# Patient Record
Sex: FEMALE | Race: BLACK OR AFRICAN AMERICAN | NOT HISPANIC OR LATINO | Employment: FULL TIME | URBAN - METROPOLITAN AREA
[De-identification: names, ages, dates, MRNs, and addresses within clinical notes are randomized per-mention and may not be internally consistent; named-entity substitution may affect disease eponyms.]

---

## 2023-10-30 PROBLEM — E04.9 ENLARGED THYROID: Status: ACTIVE | Noted: 2023-10-30

## 2023-10-30 PROBLEM — R53.83 FATIGUE: Status: ACTIVE | Noted: 2023-10-30

## 2023-10-30 PROBLEM — E66.812 CLASS II OBESITY: Status: ACTIVE | Noted: 2023-10-30

## 2023-10-30 PROBLEM — N76.0 BACTERIAL VAGINOSIS: Status: ACTIVE | Noted: 2023-10-30

## 2023-10-30 PROBLEM — B96.89 BACTERIAL VAGINOSIS: Status: ACTIVE | Noted: 2023-10-30

## 2023-10-30 PROBLEM — N89.8 VAGINAL DISCHARGE: Status: ACTIVE | Noted: 2023-10-30

## 2023-10-30 PROBLEM — N89.8 VAGINAL IRRITATION: Status: ACTIVE | Noted: 2023-10-30

## 2023-10-30 PROBLEM — R82.90 ABNORMAL URINALYSIS: Status: ACTIVE | Noted: 2023-10-30

## 2023-10-30 PROBLEM — Z72.51 HIGH RISK SEXUAL BEHAVIOR: Status: ACTIVE | Noted: 2023-10-30

## 2023-10-30 PROBLEM — B37.31 CANDIDIASIS OF VAGINA: Status: ACTIVE | Noted: 2023-10-30

## 2023-10-30 PROBLEM — R10.10 PAIN OF UPPER ABDOMEN: Status: ACTIVE | Noted: 2023-10-30

## 2023-10-30 PROBLEM — R03.0 ELEVATED BLOOD PRESSURE READING: Status: ACTIVE | Noted: 2023-10-30

## 2023-10-30 PROBLEM — E66.9 CLASS II OBESITY: Status: ACTIVE | Noted: 2023-10-30

## 2023-10-30 RX ORDER — NORETHINDRONE 0.35 MG/1
1 TABLET ORAL DAILY
COMMUNITY
Start: 2022-05-13

## 2023-10-30 RX ORDER — ULIPRISTAL ACETATE 30 MG/1
30 TABLET ORAL ONCE
COMMUNITY
Start: 2022-05-13

## 2023-10-30 RX ORDER — METRONIDAZOLE 500 MG/1
1 TABLET ORAL 2 TIMES DAILY
COMMUNITY
Start: 2022-05-16 | End: 2024-04-23 | Stop reason: ALTCHOICE

## 2024-04-07 ENCOUNTER — HOSPITAL ENCOUNTER (EMERGENCY)
Facility: HOSPITAL | Age: 39
Discharge: HOME | End: 2024-04-07
Payer: COMMERCIAL

## 2024-04-07 ENCOUNTER — APPOINTMENT (OUTPATIENT)
Dept: RADIOLOGY | Facility: HOSPITAL | Age: 39
End: 2024-04-07
Payer: COMMERCIAL

## 2024-04-07 VITALS
BODY MASS INDEX: 32.75 KG/M2 | WEIGHT: 156 LBS | HEART RATE: 75 BPM | OXYGEN SATURATION: 100 % | RESPIRATION RATE: 20 BRPM | SYSTOLIC BLOOD PRESSURE: 133 MMHG | TEMPERATURE: 98.2 F | DIASTOLIC BLOOD PRESSURE: 80 MMHG | HEIGHT: 58 IN

## 2024-04-07 DIAGNOSIS — S61.421A LACERATION OF RIGHT HAND WITH FOREIGN BODY, INITIAL ENCOUNTER: Primary | ICD-10-CM

## 2024-04-07 PROCEDURE — 2500000005 HC RX 250 GENERAL PHARMACY W/O HCPCS: Performed by: NURSE PRACTITIONER

## 2024-04-07 PROCEDURE — 90715 TDAP VACCINE 7 YRS/> IM: CPT | Performed by: NURSE PRACTITIONER

## 2024-04-07 PROCEDURE — 73130 X-RAY EXAM OF HAND: CPT | Mod: RIGHT SIDE | Performed by: RADIOLOGY

## 2024-04-07 PROCEDURE — 2500000004 HC RX 250 GENERAL PHARMACY W/ HCPCS (ALT 636 FOR OP/ED): Performed by: NURSE PRACTITIONER

## 2024-04-07 PROCEDURE — 12002 RPR S/N/AX/GEN/TRNK2.6-7.5CM: CPT | Performed by: NURSE PRACTITIONER

## 2024-04-07 PROCEDURE — 99284 EMERGENCY DEPT VISIT MOD MDM: CPT | Mod: 25

## 2024-04-07 PROCEDURE — 90471 IMMUNIZATION ADMIN: CPT | Performed by: NURSE PRACTITIONER

## 2024-04-07 PROCEDURE — 73110 X-RAY EXAM OF WRIST: CPT | Mod: RIGHT SIDE | Performed by: RADIOLOGY

## 2024-04-07 PROCEDURE — 2500000001 HC RX 250 WO HCPCS SELF ADMINISTERED DRUGS (ALT 637 FOR MEDICARE OP): Performed by: NURSE PRACTITIONER

## 2024-04-07 PROCEDURE — 73110 X-RAY EXAM OF WRIST: CPT | Mod: RT

## 2024-04-07 PROCEDURE — 73130 X-RAY EXAM OF HAND: CPT | Mod: RT

## 2024-04-07 RX ORDER — CEPHALEXIN 500 MG/1
500 CAPSULE ORAL 3 TIMES DAILY
Qty: 15 CAPSULE | Refills: 0 | Status: SHIPPED | OUTPATIENT
Start: 2024-04-07 | End: 2024-04-07 | Stop reason: SDUPTHER

## 2024-04-07 RX ORDER — CEPHALEXIN 500 MG/1
500 CAPSULE ORAL 3 TIMES DAILY
Qty: 15 CAPSULE | Refills: 0 | Status: SHIPPED | OUTPATIENT
Start: 2024-04-07 | End: 2024-04-12

## 2024-04-07 RX ORDER — NAPROXEN 500 MG/1
500 TABLET ORAL
Qty: 14 TABLET | Refills: 0 | Status: SHIPPED | OUTPATIENT
Start: 2024-04-07 | End: 2024-04-14

## 2024-04-07 RX ORDER — ACETAMINOPHEN 500 MG
1000 TABLET ORAL
Qty: 28 TABLET | Refills: 0 | Status: SHIPPED | OUTPATIENT
Start: 2024-04-07 | End: 2024-04-07 | Stop reason: SDUPTHER

## 2024-04-07 RX ORDER — LIDOCAINE HYDROCHLORIDE 10 MG/ML
5 INJECTION INFILTRATION; PERINEURAL ONCE
Status: COMPLETED | OUTPATIENT
Start: 2024-04-07 | End: 2024-04-07

## 2024-04-07 RX ORDER — CEPHALEXIN 500 MG/1
500 CAPSULE ORAL ONCE
Status: COMPLETED | OUTPATIENT
Start: 2024-04-07 | End: 2024-04-07

## 2024-04-07 RX ORDER — NAPROXEN 500 MG/1
500 TABLET ORAL
Qty: 14 TABLET | Refills: 0 | Status: SHIPPED | OUTPATIENT
Start: 2024-04-07 | End: 2024-04-07 | Stop reason: SDUPTHER

## 2024-04-07 RX ORDER — ACETAMINOPHEN 500 MG
1000 TABLET ORAL
Qty: 28 TABLET | Refills: 0 | Status: SHIPPED | OUTPATIENT
Start: 2024-04-07 | End: 2024-04-14

## 2024-04-07 RX ADMIN — CEPHALEXIN 500 MG: 500 CAPSULE ORAL at 14:39

## 2024-04-07 RX ADMIN — TETANUS TOXOID, REDUCED DIPHTHERIA TOXOID AND ACELLULAR PERTUSSIS VACCINE, ADSORBED 0.5 ML: 5; 2.5; 8; 8; 2.5 SUSPENSION INTRAMUSCULAR at 14:39

## 2024-04-07 RX ADMIN — LIDOCAINE HYDROCHLORIDE 50 MG: 10 INJECTION, SOLUTION INFILTRATION; PERINEURAL at 15:35

## 2024-04-07 ASSESSMENT — COLUMBIA-SUICIDE SEVERITY RATING SCALE - C-SSRS
1. IN THE PAST MONTH, HAVE YOU WISHED YOU WERE DEAD OR WISHED YOU COULD GO TO SLEEP AND NOT WAKE UP?: NO
2. HAVE YOU ACTUALLY HAD ANY THOUGHTS OF KILLING YOURSELF?: NO
6. HAVE YOU EVER DONE ANYTHING, STARTED TO DO ANYTHING, OR PREPARED TO DO ANYTHING TO END YOUR LIFE?: NO

## 2024-04-07 NOTE — Clinical Note
Ana Castano was seen and treated in our emergency department on 4/7/2024.  She may return to work on 04/15/2024.       If you have any questions or concerns, please don't hesitate to call.      Lukasz Borrero, ZE-CNP

## 2024-04-07 NOTE — ED PROVIDER NOTES
HPI   Chief Complaint   Patient presents with    Laceration       38-year-old female presents today with a laceration to her right hand and possible laceration to her medial scalp.  When she was opening a door at work the glass broke on the door and fell lacerating the hand and wrist.  She denies paresthesia or limited range of motion of the hand.  She does not remember her last tetanus.  She has no allergies to antibiotics.  She has full range of motion of upper and lower extremities and denies any other lacerations at this time.  She denies loss of consciousness.  She denies nausea or vomiting.  She denies vision change.  She denies posterior neck, thoracic, or lumbar pain.  She is not on any anticoagulant medication.  She denies pregnancy.      History provided by:  Patient   used: No                        No data recorded                   Patient History   Past Medical History:   Diagnosis Date    Thrombocytopenia, unspecified (CMS/[facility]) 2013    Thrombocytopenia     Past Surgical History:   Procedure Laterality Date    OTHER SURGICAL HISTORY  2016    Surgical Treatment For      Family History   Problem Relation Name Age of Onset    Diabetes Mother      Hypertension Mother      Colon cancer Father       Social History     Tobacco Use    Smoking status: Not on file    Smokeless tobacco: Not on file   Substance Use Topics    Alcohol use: Not on file    Drug use: Not on file       Physical Exam   ED Triage Vitals [24 1422]   Temperature Heart Rate Respirations BP   36.8 °C (98.2 °F) 75 20 133/80      Pulse Ox Temp Source Heart Rate Source Patient Position   100 % Oral -- --      BP Location FiO2 (%)     -- --       Physical Exam  Constitutional:       Appearance: Normal appearance.   HENT:      Head: Normocephalic and atraumatic.      Right Ear: Tympanic membrane normal.      Left Ear: Tympanic membrane normal.      Nose: Nose normal.      Mouth/Throat:      Mouth:  Mucous membranes are dry.   Eyes:      Extraocular Movements: Extraocular movements intact.      Pupils: Pupils are equal, round, and reactive to light.   Cardiovascular:      Rate and Rhythm: Normal rate and regular rhythm.      Pulses: Normal pulses.      Heart sounds: Normal heart sounds.   Pulmonary:      Effort: Pulmonary effort is normal.      Breath sounds: Normal breath sounds.   Abdominal:      General: Abdomen is flat.      Palpations: Abdomen is soft.   Musculoskeletal:         General: Normal range of motion.      Cervical back: Normal range of motion and neck supple.   Skin:     General: Skin is warm.      Capillary Refill: Capillary refill takes less than 2 seconds.      Comments: 2 lacerations to the posterior aspect of her right hand with full range of motion.  The first laceration is approximately 2 cm x 1 cm x 1 cm and the second laceration is approximately 2 cm x 0.5 cm x 0.5 cm.  She has a very slight laceration on her scalp which is 0.25 x 0.25 x 0.25 cm.   Neurological:      General: No focal deficit present.      Mental Status: She is alert and oriented to person, place, and time.   Psychiatric:         Mood and Affect: Mood normal.         Behavior: Behavior normal.         ED Course & MDM   Diagnoses as of 04/07/24 1730   Laceration of right hand with foreign body, initial encounter       Medical Decision Making  2 lacerations to the posterior aspect of her right hand with full range of motion.  The first laceration is approximately 2 cm x 1 cm x 1 cm and the second laceration is approximately 2 cm x 0.5 cm x 0.5 cm.  She has a very slight laceration on her scalp which is 0.25 x 0.25 x 0.25 cm..  She had full range of motion.  She received tetanus booster.  She received Keflex.  X-ray was ordered of wrist and hand prior to repair to rule out foreign body as it was glass and should be easily seen on x-ray.  X-ray confirmed no fracture or subluxation.  There was no radiopaque foreign body.   Wound was thoroughly irrigated with Hibiclens and normal saline.  It was repaired with a total of 10 sutures.  There were 5 sutures to the 1 laceration of the dorsal hand for lacerations to the second laceration of the dorsal hand and one laceration to the third laceration for a total of 10.  These were 4 point 0 nylon sutures should be removed in 7 to 10 days.  Patient received tetanus booster.  She was placed on Keflex for 5 days.  I requested an appointment with hand surgery Dr. Paramjit Mcduffie for outpatient follow-up as needed and patient received her requested work note.    Amount and/or Complexity of Data Reviewed  Radiology: ordered and independent interpretation performed.        Procedure  Laceration Repair    Performed by: MECHELLE Back  Authorized by: MECHELLE Back    Consent:     Consent obtained:  Verbal    Consent given by:  Patient    Risks discussed:  Infection, pain and retained foreign body    Alternatives discussed:  No treatment, delayed treatment, observation and referral  Universal protocol:     Procedure explained and questions answered to patient or proxy's satisfaction: yes      Imaging studies available: yes      Patient identity confirmed:  Verbally with patient  Anesthesia:     Anesthesia method:  Local infiltration    Local anesthetic:  Lidocaine 1% w/o epi  Laceration details:     Location:  Hand    Hand location:  R hand, dorsum    Length (cm):  4    Depth (mm):  5  Pre-procedure details:     Preparation:  Patient was prepped and draped in usual sterile fashion  Exploration:     Limited defect created (wound extended): no      Hemostasis achieved with:  Direct pressure    Imaging obtained: x-ray      Imaging outcome: foreign body not noted      Wound exploration: wound explored through full range of motion and entire depth of wound visualized      Wound extent: no areolar tissue violation noted, no fascia violation noted, no foreign bodies/material noted, no  muscle damage noted, no nerve damage noted, no tendon damage noted, no underlying fracture noted and no vascular damage noted      Contaminated: no    Treatment:     Area cleansed with:  Chlorhexidine and saline    Amount of cleaning:  Extensive    Irrigation solution:  Sterile saline    Irrigation volume:  1l    Irrigation method:  Pressure wash    Visualized foreign bodies/material removed: no      Debridement:  None    Undermining:  None    Scar revision: no    Skin repair:     Repair method:  Sutures    Suture size:  4-0    Suture material:  Nylon    Suture technique:  Simple interrupted    Number of sutures:  10  Approximation:     Approximation:  Close  Repair type:     Repair type:  Simple  Post-procedure details:     Dressing:  Non-adherent dressing    Procedure completion:  Tolerated       MECHELLE Back  04/07/24 1623       ZE Back-ODALIS  04/07/24 1735

## 2024-04-07 NOTE — ED TRIAGE NOTES
Pt. Presents to the ED from work. 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 Two lacerations noted on right hand. Scant amount of blood noted on top of fore head. No LOC, no blood thinner. Patient alert and oriented x4.

## 2024-04-23 ENCOUNTER — OFFICE VISIT (OUTPATIENT)
Dept: ORTHOPEDIC SURGERY | Facility: CLINIC | Age: 39
End: 2024-04-23
Payer: COMMERCIAL

## 2024-04-23 DIAGNOSIS — S61.411A COMPLICATED LACERATION OF HAND, RIGHT, INITIAL ENCOUNTER: Primary | ICD-10-CM

## 2024-04-23 PROCEDURE — 99203 OFFICE O/P NEW LOW 30 MIN: CPT | Performed by: ORTHOPAEDIC SURGERY

## 2024-04-23 PROCEDURE — 1036F TOBACCO NON-USER: CPT | Performed by: ORTHOPAEDIC SURGERY

## 2024-04-23 RX ORDER — NORGESTIMATE AND ETHINYL ESTRADIOL 0.25-0.035
KIT ORAL
COMMUNITY
Start: 2018-03-12 | End: 2024-04-23 | Stop reason: ALTCHOICE

## 2024-04-23 ASSESSMENT — PAIN - FUNCTIONAL ASSESSMENT: PAIN_FUNCTIONAL_ASSESSMENT: NO/DENIES PAIN

## 2024-04-23 NOTE — LETTER
April 23, 2024     Patient: Ana Castano   YOB: 1985   Date of Visit: 4/23/2024       To Whom It May Concern:    It is my medical opinion that Aan Castano may return to work on 4/29/24, with no restrictions .    If you have any questions or concerns, please don't hesitate to call.         Sincerely,        Paramjit Mcduffie MD

## 2024-04-23 NOTE — PROGRESS NOTES
History of Present Illness:  Chief Complaint   Patient presents with    Right Hand - Laceration       Patient presents today for evaluation of right hand laceration about the dorsal aspect of her right hand in line with the index finger.  She was opening a door at work when the glass on the door broke and caused a laceration to her right hand.  She had immediate pain as well as some bleeding.  No numbness or tingling.  She was seen in the emergency room where wound is irrigated and reapproximated with nonabsorbable suture.  She was started on oral prophylactic antibiotics and tetanus updated.  She did also sustain injury to her head from the door.  She has further follow-up scheduled with another provider for that.    Past Medical History:   Diagnosis Date    Thrombocytopenia, unspecified (CMS-[facility]) 12/14/2013    Thrombocytopenia       Medication Documentation Review Audit       Reviewed by Francesca Stroud CMA (Medical Assistant) on 04/23/24 at 0940      Medication Order Taking? Sig Documenting Provider Last Dose Status     Discontinued 04/23/24 0940   norethindrone (Josee) 0.35 mg tablet [id number]  Take 1 tablet (0.35 mg) by mouth once daily. TAKE AT THE SAME TIME EVERY DAY, USE A BACKUP METHOD OF CONTRACEPTION IF YOU MISS A PILL.  TO START 5 DAYS AFTER EMERGENCY BIRTHCONTROL Historical Provider, MD  Active    Discontinued 04/23/24 0940   ulipristal (Lory) 30 mg tablet [id number]  Take 1 tablet (30 mg) by mouth 1 time. TAKE UP  HOURS AFTER UNPROTECTED INTERCOURSE Historical Provider, MD  Active                    Allergies   Allergen Reactions    Peanut Swelling       Social History     Socioeconomic History    Marital status: Single     Spouse name: Not on file    Number of children: Not on file    Years of education: Not on file    Highest education level: Not on file   Occupational History    Not on file   Tobacco Use    Smoking status: Never    Smokeless tobacco: Never   Substance and Sexual Activity     Alcohol use: Not Currently    Drug use: Never    Sexual activity: Defer   Other Topics Concern    Not on file   Social History Narrative    Not on file     Social Determinants of Health     Financial Resource Strain: Not on file   Food Insecurity: Not on file   Transportation Needs: Not on file   Physical Activity: Not on file   Stress: Not on file   Social Connections: Not on file   Intimate Partner Violence: Not on file   Housing Stability: Not on file       Past Surgical History:   Procedure Laterality Date    OTHER SURGICAL HISTORY  2016    Surgical Treatment For         Review of Systems   GENERAL: Negative for malaise, significant weight loss, fever  MUSCULOSKELETAL: see HPI  NEURO:  Negative     Physical Examination  Constitutional: Appears well-developed and well-nourished.  Head: Normocephalic and atraumatic.  Eyes: EOMI grossly  Cardiovascular: Intact distal pulses.   Respiratory: Effort normal. No respiratory distress.  Neurologic: Alert and oriented to person, place, and time.  Skin: Skin is warm and dry.  Hematologic / Lymphatic: No lymphedema, lymphangitis.  Psychiatric: normal mood and affect. Behavior is normal.   Musculoskeletal:  Right hand: Laceration is well-healing and sutures removed.  No signs of infection.  0 cm DPC.  EIP/EDC to index finger grossly intact.  Sensation intact throughout distally.  Capillary refill less than 2 seconds.    Radiographs: Right hand radiographs available for review: No fracture/dislocation.  Joint spaces maintained.     Assessment:  Patient with well-healing right hand laceration without evidence of neurovascular or tendon involvement.     Plan:  Nature of the diagnosis was discussed with the patient.  Sutures were removed uneventfully.  Recommend that she begin scar tissue massage and gradual progression of activities as tolerated.  From standpoint of her hand she may return to work without restrictions.  Follow-up in 4 weeks for clinical check if  any issues or concerns.

## 2024-07-09 ENCOUNTER — APPOINTMENT (OUTPATIENT)
Dept: PRIMARY CARE | Facility: CLINIC | Age: 39
End: 2024-07-09
Payer: COMMERCIAL

## 2024-08-19 ENCOUNTER — HOSPITAL ENCOUNTER (EMERGENCY)
Facility: HOSPITAL | Age: 39
Discharge: HOME | End: 2024-08-19
Payer: COMMERCIAL

## 2024-08-19 VITALS
BODY MASS INDEX: 33.26 KG/M2 | HEART RATE: 71 BPM | SYSTOLIC BLOOD PRESSURE: 123 MMHG | WEIGHT: 165 LBS | TEMPERATURE: 98.6 F | RESPIRATION RATE: 19 BRPM | OXYGEN SATURATION: 99 % | DIASTOLIC BLOOD PRESSURE: 75 MMHG | HEIGHT: 59 IN

## 2024-08-19 DIAGNOSIS — J02.9 VIRAL PHARYNGITIS: Primary | ICD-10-CM

## 2024-08-19 LAB
HETEROPH AB SERPLBLD QL IA.RAPID: NEGATIVE
S PYO DNA THROAT QL NAA+PROBE: NOT DETECTED
SARS-COV-2 RNA RESP QL NAA+PROBE: NOT DETECTED

## 2024-08-19 PROCEDURE — 87651 STREP A DNA AMP PROBE: CPT

## 2024-08-19 PROCEDURE — 99283 EMERGENCY DEPT VISIT LOW MDM: CPT

## 2024-08-19 PROCEDURE — 36415 COLL VENOUS BLD VENIPUNCTURE: CPT

## 2024-08-19 PROCEDURE — 87635 SARS-COV-2 COVID-19 AMP PRB: CPT

## 2024-08-19 PROCEDURE — 2500000001 HC RX 250 WO HCPCS SELF ADMINISTERED DRUGS (ALT 637 FOR MEDICARE OP)

## 2024-08-19 PROCEDURE — 86308 HETEROPHILE ANTIBODY SCREEN: CPT

## 2024-08-19 RX ORDER — IBUPROFEN 400 MG/1
400 TABLET ORAL ONCE
Status: COMPLETED | OUTPATIENT
Start: 2024-08-19 | End: 2024-08-19

## 2024-08-19 RX ORDER — NAPROXEN 500 MG/1
500 TABLET ORAL EVERY 12 HOURS PRN
Qty: 14 TABLET | Refills: 0 | Status: SHIPPED | OUTPATIENT
Start: 2024-08-19 | End: 2024-08-19

## 2024-08-19 RX ORDER — LIDOCAINE HYDROCHLORIDE 20 MG/ML
1.25 SOLUTION OROPHARYNGEAL ONCE
Status: COMPLETED | OUTPATIENT
Start: 2024-08-19 | End: 2024-08-19

## 2024-08-19 RX ORDER — NAPROXEN 500 MG/1
500 TABLET ORAL EVERY 12 HOURS PRN
Qty: 14 TABLET | Refills: 0 | Status: SHIPPED | OUTPATIENT
Start: 2024-08-19 | End: 2024-08-26

## 2024-08-19 RX ORDER — AMOXICILLIN 500 MG/1
500 TABLET, FILM COATED ORAL 2 TIMES DAILY
Qty: 20 TABLET | Refills: 0 | Status: SHIPPED | OUTPATIENT
Start: 2024-08-23 | End: 2024-08-19

## 2024-08-19 RX ORDER — AMOXICILLIN 500 MG/1
500 TABLET, FILM COATED ORAL 2 TIMES DAILY
Qty: 20 TABLET | Refills: 0 | Status: SHIPPED | OUTPATIENT
Start: 2024-08-23 | End: 2024-09-02

## 2024-08-19 RX ORDER — ACETAMINOPHEN 325 MG/1
975 TABLET ORAL ONCE
Status: COMPLETED | OUTPATIENT
Start: 2024-08-19 | End: 2024-08-19

## 2024-08-19 ASSESSMENT — COLUMBIA-SUICIDE SEVERITY RATING SCALE - C-SSRS
2. HAVE YOU ACTUALLY HAD ANY THOUGHTS OF KILLING YOURSELF?: NO
6. HAVE YOU EVER DONE ANYTHING, STARTED TO DO ANYTHING, OR PREPARED TO DO ANYTHING TO END YOUR LIFE?: NO
1. IN THE PAST MONTH, HAVE YOU WISHED YOU WERE DEAD OR WISHED YOU COULD GO TO SLEEP AND NOT WAKE UP?: NO

## 2024-08-19 ASSESSMENT — PAIN - FUNCTIONAL ASSESSMENT: PAIN_FUNCTIONAL_ASSESSMENT: 0-10

## 2024-08-19 ASSESSMENT — PAIN DESCRIPTION - LOCATION: LOCATION: THROAT

## 2024-08-19 ASSESSMENT — PAIN SCALES - GENERAL: PAINLEVEL_OUTOF10: 10 - WORST POSSIBLE PAIN

## 2024-08-19 ASSESSMENT — PAIN DESCRIPTION - DESCRIPTORS: DESCRIPTORS: SORE;BURNING

## 2024-08-19 NOTE — ED PROVIDER NOTES
HPI   Chief Complaint   Patient presents with    Sore Throat       HPI  HPI:   HISTORY OF PRESENT ILLNESS:  39 y.o. female presenting to the ED with complaint of a sore throat for the past 3-4 days.  Patient reports a sore throat and significant pain with swallowing, states that she has had a significantly decreased oral intake over the past 3 to 4 days due to sore throat.  She states that she is able to swallow, able to tolerate food and fluids, but it hurts so she has not been eating or drinking much.  She states that she thinks she had a fever when her symptoms began, had chills but did not take her temperature at home, this has now resolved.  She also states that she has occasionally felt lightheaded when she moves from sitting to standing, and feels generally weak and tired.  She thinks this is due to not eating or drinking much over the past 3 to 4 days.  She denies shortness of breath, this was reported to triage but she denies any shortness of breath my evaluation.  Has no chest pain or pleuritic pain.  No cough, no nasal congestion or rhinorrhea.  No headache, no blurry vision, no neck pain or stiffness.  No pain with eye movements.  No lower extremity pain or swelling.  Denies abdominal pain, vomiting, or diarrhea.  No urinary symptoms.  She states that she has tried Tylenol, NyQuil, and DayQuil without relief, last dose of NyQuil was at 1 AM, about 6 hours prior to arrival.  Denies any chance of pregnancy.  No known sick contacts.  Not vaccinated for COVID.  No other complaints or symptoms voiced.    ROS:  General: No decreased responsiveness, confusion, no decreased appetite or fluids, no fever  Neuro: no headache, no syncope, no numbness or tingling  ENMT: No nosebleed, + sore throat, no nasal congestion  Eyes: No discharge or redness  Skel: No joint pain, no neck or back pain  Cardiac: No chest pain or palpitations  Resp: No shortness of breath, wheezing no cough  GI: No abdominal pain, nausea vomiting  or diarrhea  : No dysuria, no flank pain  Skin: no rash or wounds  Heme: no bruising, bleeding or petechiae    PMH: HTN  Social History: +smoker, no drinking or drugs  Family History: Noncontributory    Physical Exam:  General: Vital signs stable, Pt is alert, no acute distress  Eyes: Conjunctiva normal, PERRL, EOMs intact  HENMT: Normocephalic, atraumatic, external ears and nose normal, no scars or masses, right TM normal, no erythema, left TM normal, no erythema. Moist mucous membranes. +pharyngeal erythema, tonsils 2+ bilaterally with tonsillar exudate.  Uvula is midline, no trismus. Trachea is midline.  Normal phonation, no hot potato voice.  No tenderness, edema, or crepitus over the anterior neck or submandibular area.  Tolerating secretions.  Trachea is midline. No meningeal signs, moves neck freely.  No sinus tenderness or nasal congestion.  Resp: Respiratory effort is normal, no retractions, no stridor. Lungs CTA, no wheezes or rhonchi  CV: Heart is regular rate and rhythm.   Lymph: + anterior cervical lymphadenopathy  Skin: No evidence of trauma, skin is warm and dry. No rashes, lesions or ulcers.  Skel: full range of motion of upper and lower extremities.   Neuro: Normal gait, CN II-XII intact, no motor or sensory changes.  Psych: Alert and oriented ×3, judgment is appropriate, normal mood and affect     Patient History   Past Medical History:   Diagnosis Date    Thrombocytopenia, unspecified (CMS-HCC) 2013    Thrombocytopenia     Past Surgical History:   Procedure Laterality Date    OTHER SURGICAL HISTORY  2016    Surgical Treatment For      Family History   Problem Relation Name Age of Onset    Diabetes Mother      Hypertension Mother      Colon cancer Father       Social History     Tobacco Use    Smoking status: Never    Smokeless tobacco: Never   Substance Use Topics    Alcohol use: Not Currently    Drug use: Never       Physical Exam   ED Triage Vitals [24 0727]    Temperature Heart Rate Respirations BP   36.4 °C (97.6 °F) 71 18 121/82      Pulse Ox Temp Source Heart Rate Source Patient Position   99 % Oral -- --      BP Location FiO2 (%)     -- --     Physical Exam  ED Course & MDM   Diagnoses as of 08/19/24 0852   Viral pharyngitis     Medical Decision Making  ED course / MDM     Summary:  Patient presented with a sore throat for the past 3 to 4 days.  Vital signs are stable, patient is very well-appearing.  Ambulates unassisted, no acute distress.  Lungs clear to auscultation, heart regular rate and rhythm.  No nuchal rigidity or meningeal signs.  There is pharyngeal erythema, tonsils 2+ bilaterally with exudates, uvula midline, normal phonation, no hot potato voice.  No evidence of PTA or RPA.  Normal head neck range of motion.  Tolerating secretions.  No airway compromise.  No tripoding.  No respiratory distress.  Swabs for COVID and group A strep are negative.  Patient was given a dose of Tylenol, ibuprofen, and viscous lidocaine in the ED, which did improve her symptoms.  Passed a p.o. challenge, no difficulty tolerating oral intake, drank fluids and ate snacks without issues.  Discussed the option for mononucleosis testing, and patient prefers to be tested, Monospot test was sent.  Patient does have MyChart, and as this test result will not , patient does not need to wait for results, will review results in MyChart.  Did discuss the option for further workup including labs or imaging such as chest x-ray or CT neck, but as patient has no symptoms aside from a sore throat, as she is able to tolerate oral intake without difficulty, and has stable vitals with no evidence of sepsis, severe systemic illness, or deep space head or neck infection, patient agrees that further workup including labs or imaging is not indicated at this time.  Symptoms very likely due to viral pharyngitis.  Patient is very concerned that she may need antibiotic treatment, at this  time there is currently no indication for antibiotics as was discussed with the patient in detail, she is very concerned that she may need to return for antibiotics if her symptoms continue.  Therefore we will send antibiotics and hand to  from her pharmacy and 5 days if symptoms continue, as she will of had symptoms for 9 days at that time.  Prescription for amoxicillin sent to her pharmacy with this pickup date.  She was also sent a prescription for naproxen and HurriCaine spray.  Patient was given a work note as requested.  Advised to follow-up with her PCP.  Eager for discharge.  Stable for discharge with supportive care and outpatient PCP follow-up. Patient was given strict return precautions, understands reasons to return to the ED. Also discussed supportive care instructions. I expressed the importance of outpatient follow up with their PCP. All questions were answered, patient expressed understanding and stated that they would comply.    Impression:  1. See diagnosis    Plan: Homegoing. I discussed the differential, results, and discharge plan with the patient and family/friend/caregiver. Patient was advised to follow up with PCP or recommended provider in 2-3 days for another evaluation and exam. I emphasized the importance of follow-up with the physician I referred them to in the timeframe recommended.  I explained reasons for the patient to return to the Emergency Department. They agreed that if they feel their condition is worsening,  if symptoms change, get worse, new symptoms develop prior to follow up, or if they have any other concern they should call 911 immediately for further assistance. If there is no improvement in symptoms in the next 24 hours they are advised to return for further evaluation and exam. I also explained the plan and treatment course. We also discussed medications that were prescribed including common side effects and interactions. The patient was advised to abstain from  driving, operating heavy machinery, or making significant decisions while taking medications such as opiates and muscle relaxers that may impair this. I gave the patient an opportunity to ask all questions they had and answered all of them accordingly. They understand return precautions and discharge instructions. Patient and family/friend/caregiver/guardian is in agreement with plan, treatment course, and follow up and states verbally that they will comply.     Disposition: Discharge    This note has been transcribed using voice recognition and may contain grammatical errors, misplaced words, incorrect words, incorrect phrases or other errors.   Procedure  Procedures     Gunjan Hansen PA-C  08/19/24 0909

## 2024-08-19 NOTE — Clinical Note
Ana Castano was seen and treated in our emergency department on 8/19/2024.  She may return to work on 08/23/2024.       If you have any questions or concerns, please don't hesitate to call.      Gunjan Hansen PA-C

## 2024-08-19 NOTE — ED TRIAGE NOTES
Pt presents to the ED with c/o feeling sick since Friday. Pt states she can't swallow due to how sore her throat is. Pain radiates into ears as well as neck. Pt states she feels she has gotten worse since Friday. Pt states she took Tylenol, Nyquil and Dayquil with no relief. Pt feels weak and gets SOB when she first tries to walk.

## 2024-10-04 ENCOUNTER — APPOINTMENT (OUTPATIENT)
Dept: OBSTETRICS AND GYNECOLOGY | Facility: HOSPITAL | Age: 39
End: 2024-10-04
Payer: COMMERCIAL

## 2024-10-08 ENCOUNTER — APPOINTMENT (OUTPATIENT)
Dept: NEUROLOGY | Facility: CLINIC | Age: 39
End: 2024-10-08
Payer: COMMERCIAL

## 2024-10-08 VITALS
WEIGHT: 186 LBS | BODY MASS INDEX: 36.52 KG/M2 | HEART RATE: 72 BPM | SYSTOLIC BLOOD PRESSURE: 100 MMHG | TEMPERATURE: 97.1 F | HEIGHT: 60 IN | DIASTOLIC BLOOD PRESSURE: 68 MMHG

## 2024-10-08 DIAGNOSIS — S06.0X0A CONCUSSION WITHOUT LOSS OF CONSCIOUSNESS, INITIAL ENCOUNTER: Primary | ICD-10-CM

## 2024-10-08 DIAGNOSIS — G44.329 CHRONIC POST-TRAUMATIC HEADACHE, NOT INTRACTABLE: ICD-10-CM

## 2024-10-08 PROCEDURE — 3008F BODY MASS INDEX DOCD: CPT | Performed by: PSYCHIATRY & NEUROLOGY

## 2024-10-08 PROCEDURE — 99204 OFFICE O/P NEW MOD 45 MIN: CPT | Performed by: PSYCHIATRY & NEUROLOGY

## 2024-10-08 PROCEDURE — 1036F TOBACCO NON-USER: CPT | Performed by: PSYCHIATRY & NEUROLOGY

## 2024-10-08 RX ORDER — NORTRIPTYLINE HYDROCHLORIDE 25 MG/1
CAPSULE ORAL
Qty: 60 CAPSULE | Refills: 3 | Status: SHIPPED | OUTPATIENT
Start: 2024-10-08

## 2024-10-08 NOTE — PATIENT INSTRUCTIONS
Nortriptyline:  1 at bedtime for two weeks and if no change in the number of headaches, increase to 2 at bedtime.  Please call in 2 weeks with progress on the headaches.  You can use Excedrin, take it as soon as the headache begins so it will stop sooner.    For neck tightness:  Sleep with neck support (rolled pillow, rolled towel, neck support pillow)  Neck stretches twice daily as demonstrated  If no changes with the neck support and exercises, I will consider adding a muscle relaxant at bedtime

## 2024-10-08 NOTE — PROGRESS NOTES
NEUROLOGY OUTPATIENT INITIAL VISIT NOTE    Assessment/Plan   Diagnoses and all orders for this visit:  Concussion without loss of consciousness, initial encounter  -     MR brain wo IV contrast; Future  Chronic post-traumatic headache, not intractable  -     MR brain wo IV contrast; Future  -     nortriptyline (Pamelor) 25 mg capsule; 1 by mouth at bedtime for two weeks, then 2 at bedtime      IMPRESSION:  The patient had a concussion and post-traumatic headache directly referable to her workplace accident.  She has no residual from the concussion.    PLAN:  Given persistent symptoms and no brain imaging at the time of the incident, I have ordered a cranial MRI.  I started her on nortriptyline titration to 50 mg at bedtime for headache prophylaxis.  I will see her again in 3-4 months or prn.      Marcus Hitchcock Jr., M.D., FAAN     --------      Subjective     Ana Castano is a 39 y.o. year old, right-handed female self-referred for headaches    HPI  Headaches since a workplace accident on 4/7/2024.  She plugged in a vacuum  behind a glass door.  She moved to open the door when the door fell.  It struck her head on the way down, and her right hand went through the glass of the door.  She sustained a laceration on the dorsum of the right hand.  She felt dazed and tremulous after the door struck her head, but she didn't lose consciousness. She presented to the [facility] ED, where she has plain films of the right wrist, and the hand laceration was treated, but she had no brain imaging; the note doesn't indicate anything about the closed head injury.  She has since had headaches, which she did not have prior to the incident.    The headaches are bitemporal and bifrontal pressure with nausea and photophobia, but no phonophobia or emesis.  The headaches last several hours.  Headache frequency is 4-5 days a week.  Ibuprofen helps but leads to rebound.  Excedrin helps more.      She denies other focal neurological  symptoms including dysarthria, dysphagia, diplopia, focal weakness, focal sensory change, ataxia, vertigo, or bowel/bladder incontinence, among others.  She recalls a single episode of lightheadedness in August, none since.    Denies family history of headaches.        Review of Systems   All other systems reviewed and are negative.      Past Medical History:   Diagnosis Date    Thrombocytopenia, unspecified (CMS-HCC) 2013    Thrombocytopenia     Past Surgical History:   Procedure Laterality Date    OTHER SURGICAL HISTORY  2016    Surgical Treatment For      Social History     Tobacco Use    Smoking status: Never    Smokeless tobacco: Never   Substance Use Topics    Alcohol use: Yes     Comment: Occasionally     family history includes Colon cancer in her father; Diabetes in her mother; Hypertension in her mother.    Current Outpatient Medications:     norethindrone (Josee) 0.35 mg tablet, Take 1 tablet (0.35 mg) by mouth once daily. TAKE AT THE SAME TIME EVERY DAY, USE A BACKUP METHOD OF CONTRACEPTION IF YOU MISS A PILL.  TO START 5 DAYS AFTER EMERGENCY BIRTHCONTROL, Disp: , Rfl:     ulipristal (Lory) 30 mg tablet, Take 1 tablet (30 mg) by mouth 1 time. TAKE UP  HOURS AFTER UNPROTECTED INTERCOURSE, Disp: , Rfl:   Allergies   Allergen Reactions    Peanut Swelling       Objective     /68   Pulse 72   Temp 36.2 °C (97.1 °F)   Ht 1.524 m (5')   Wt 84.4 kg (186 lb)   BMI 36.33 kg/m²     CONSTITUTIONAL:  No acute distress    HEENT:    Tiny left frontal scalp, just off midline, bump without erythema, edema, or tenderness.    CARDIOVASCULAR:  Normal pulses in the distal legs, no edema of either arm or either leg.  No carotid bruits.    MENTAL STATUS:  Awake, alert, fully oriented to self, place, and time, with present short-term memory, good awareness of recent events, normal attention span, concentration, and fund of knowledge.    SPEECH AND LANGUAGE:  Can name and repeat, follows all  commands, has no dysarthria    FUNDOSCOPIC:  No papilledema    CRANIAL NERVES:  II-Vision present, visual fields full to confrontational testing    III/IV/VI--EOMs are present in all directions.  Pupils are symmetrically reactive in dim light.  No ptosis.    V--Normal facial sensation.    VII--No facial asymmetry.    VIII--Hearing present to voice bilaterally.    IX/X--Symmetric soft palate rise.    XI--Normal trapezius power bilaterally.    XII--Tongue protrudes without deviation.    MOTOR:  Normal power, tone, and bulk in both arms and both legs.    SENSORY:  Normal pin sensation in both arms and both legs without distal-proximal gradient, asymmetry, or spinal sensory level.    COORDINATION:  Normal finger-to-nose and heel-to-shin testing in both arms and both legs.    REFLEXES are normal and symmetric at the biceps, triceps, brachioradialis, patella, and ankle.  The plantar responses are flexor.    GAIT is normal, without steppage, ataxia, shuffling, or spasticity.        Marcus Hitchcock Jr., M.D., FAAN

## 2024-10-24 ENCOUNTER — HOSPITAL ENCOUNTER (OUTPATIENT)
Dept: RADIOLOGY | Facility: HOSPITAL | Age: 39
End: 2024-10-24

## 2024-12-03 ENCOUNTER — HOSPITAL ENCOUNTER (OUTPATIENT)
Dept: RADIOLOGY | Facility: HOSPITAL | Age: 39
Discharge: HOME | End: 2024-12-03
Payer: COMMERCIAL

## 2024-12-03 DIAGNOSIS — S06.0X0A CONCUSSION WITHOUT LOSS OF CONSCIOUSNESS, INITIAL ENCOUNTER: ICD-10-CM

## 2024-12-03 DIAGNOSIS — G44.329 CHRONIC POST-TRAUMATIC HEADACHE, NOT INTRACTABLE: ICD-10-CM

## 2024-12-03 PROCEDURE — 70551 MRI BRAIN STEM W/O DYE: CPT | Performed by: RADIOLOGY

## 2024-12-03 PROCEDURE — 70551 MRI BRAIN STEM W/O DYE: CPT

## 2024-12-05 ENCOUNTER — APPOINTMENT (OUTPATIENT)
Dept: NEUROLOGY | Facility: CLINIC | Age: 39
End: 2024-12-05
Payer: COMMERCIAL

## 2025-01-27 ENCOUNTER — APPOINTMENT (OUTPATIENT)
Dept: NEUROLOGY | Facility: CLINIC | Age: 40
End: 2025-01-27
Payer: COMMERCIAL